# Patient Record
Sex: MALE | Race: WHITE
[De-identification: names, ages, dates, MRNs, and addresses within clinical notes are randomized per-mention and may not be internally consistent; named-entity substitution may affect disease eponyms.]

---

## 2018-09-30 ENCOUNTER — HOSPITAL ENCOUNTER (EMERGENCY)
Dept: HOSPITAL 8 - ED | Age: 53
Discharge: HOME | End: 2018-09-30
Payer: SELF-PAY

## 2018-09-30 VITALS — HEIGHT: 74 IN | WEIGHT: 205.69 LBS | BODY MASS INDEX: 26.4 KG/M2

## 2018-09-30 VITALS — DIASTOLIC BLOOD PRESSURE: 115 MMHG | SYSTOLIC BLOOD PRESSURE: 181 MMHG

## 2018-09-30 DIAGNOSIS — I10: ICD-10-CM

## 2018-09-30 DIAGNOSIS — B96.89: ICD-10-CM

## 2018-09-30 DIAGNOSIS — Z88.8: ICD-10-CM

## 2018-09-30 DIAGNOSIS — J20.9: Primary | ICD-10-CM

## 2018-09-30 PROCEDURE — 99283 EMERGENCY DEPT VISIT LOW MDM: CPT

## 2019-02-02 ENCOUNTER — HOSPITAL ENCOUNTER (EMERGENCY)
Facility: MEDICAL CENTER | Age: 54
End: 2019-02-02

## 2019-02-02 VITALS
HEIGHT: 74 IN | HEART RATE: 89 BPM | DIASTOLIC BLOOD PRESSURE: 123 MMHG | TEMPERATURE: 98.5 F | SYSTOLIC BLOOD PRESSURE: 166 MMHG | OXYGEN SATURATION: 97 % | WEIGHT: 194.22 LBS | BODY MASS INDEX: 24.93 KG/M2 | RESPIRATION RATE: 16 BRPM

## 2019-02-02 PROCEDURE — 302449 STATCHG TRIAGE ONLY (STATISTIC)

## 2019-02-03 NOTE — ED TRIAGE NOTES
"Triage rn aware of vital signs. Patient stated \"I'm supposed to be taking blood pressure meds for months. I couldn't afford it\".    "

## 2019-02-03 NOTE — ED TRIAGE NOTES
"Henrry Graves Milad  53 y.o. male  Chief Complaint   Patient presents with   • Foot Pain     bilat minor blisters on soles of feet.    BP (!) 166/123 Comment: left arm   Pulse 89   Temp 36.9 °C (98.5 °F) (Temporal)   Resp 16   Ht 1.88 m (6' 2\")   Wt 88.1 kg (194 lb 3.6 oz)   SpO2 97%   BMI 24.94 kg/m²     Pt amb to triage with steady gait for above complaint. Pt is only asking for recommendations for less expensive treatment options. Pt was educated on keeping feet dry, wearing thicker socks. Allowing feet and lee toe boots to dry out. Affirmation received, pt is recently homeless and walks a lot.  pt left with no desire to check in to be seen by an ERP.    Pt is alert and oriented, speaking in full sentences, follows commands and responds appropriately to questions. NAD. Resp are even and unlabored.      "

## 2019-03-04 ENCOUNTER — HOSPITAL ENCOUNTER (EMERGENCY)
Dept: HOSPITAL 8 - ED | Age: 54
Discharge: HOME | End: 2019-03-04
Payer: SELF-PAY

## 2019-03-04 VITALS — SYSTOLIC BLOOD PRESSURE: 190 MMHG | DIASTOLIC BLOOD PRESSURE: 121 MMHG

## 2019-03-04 VITALS — HEIGHT: 73 IN | BODY MASS INDEX: 24.28 KG/M2 | WEIGHT: 183.2 LBS

## 2019-03-04 DIAGNOSIS — I10: ICD-10-CM

## 2019-03-04 DIAGNOSIS — M65.271: Primary | ICD-10-CM

## 2019-03-04 DIAGNOSIS — F17.210: ICD-10-CM

## 2019-03-04 PROCEDURE — 99283 EMERGENCY DEPT VISIT LOW MDM: CPT

## 2019-04-04 ENCOUNTER — HOSPITAL ENCOUNTER (EMERGENCY)
Dept: HOSPITAL 8 - ED | Age: 54
End: 2019-04-04
Payer: COMMERCIAL

## 2019-04-04 VITALS — BODY MASS INDEX: 24.62 KG/M2 | WEIGHT: 191.8 LBS | HEIGHT: 74 IN

## 2019-04-04 VITALS — SYSTOLIC BLOOD PRESSURE: 166 MMHG | DIASTOLIC BLOOD PRESSURE: 120 MMHG

## 2019-04-04 DIAGNOSIS — K59.00: Primary | ICD-10-CM

## 2019-04-04 PROCEDURE — 99283 EMERGENCY DEPT VISIT LOW MDM: CPT

## 2019-04-04 NOTE — NUR
Pt BIB EMS from [facility] for rectal/lower abd pain. Pt states he feels the need 
to urinate and have a BM but is unable to x 4 hours. Pt states normal BM 24 
hours ago. denies FB.

## 2020-04-23 ENCOUNTER — HOSPITAL ENCOUNTER (EMERGENCY)
Dept: HOSPITAL 8 - ED | Age: 55
Discharge: HOME | End: 2020-04-23
Payer: COMMERCIAL

## 2020-04-23 VITALS — SYSTOLIC BLOOD PRESSURE: 173 MMHG | DIASTOLIC BLOOD PRESSURE: 120 MMHG

## 2020-04-23 VITALS — BODY MASS INDEX: 27.19 KG/M2 | HEIGHT: 74 IN | WEIGHT: 211.86 LBS

## 2020-04-23 DIAGNOSIS — I10: ICD-10-CM

## 2020-04-23 DIAGNOSIS — L03.116: Primary | ICD-10-CM

## 2020-04-23 PROCEDURE — 99283 EMERGENCY DEPT VISIT LOW MDM: CPT
